# Patient Record
Sex: MALE | Race: BLACK OR AFRICAN AMERICAN | NOT HISPANIC OR LATINO | Employment: UNEMPLOYED | RURAL
[De-identification: names, ages, dates, MRNs, and addresses within clinical notes are randomized per-mention and may not be internally consistent; named-entity substitution may affect disease eponyms.]

---

## 2024-07-07 ENCOUNTER — HOSPITAL ENCOUNTER (EMERGENCY)
Facility: HOSPITAL | Age: 59
Discharge: HOME | End: 2024-07-07
Attending: EMERGENCY MEDICINE

## 2024-07-07 VITALS
BODY MASS INDEX: 21.7 KG/M2 | SYSTOLIC BLOOD PRESSURE: 130 MMHG | RESPIRATION RATE: 20 BRPM | OXYGEN SATURATION: 95 % | HEIGHT: 71 IN | HEART RATE: 103 BPM | WEIGHT: 155 LBS | TEMPERATURE: 98 F | DIASTOLIC BLOOD PRESSURE: 102 MMHG

## 2024-07-07 DIAGNOSIS — R52 BODY ACHES: Primary | ICD-10-CM

## 2024-07-07 PROCEDURE — 99281 EMR DPT VST MAYX REQ PHY/QHP: CPT

## 2024-07-07 NOTE — ED PROVIDER NOTES
"Encounter Date: 7/7/2024       History     Chief Complaint   Patient presents with    Generalized Body Aches     Pt c/o body aches "all over"      Patient is a 58-year-old male with history of mental illness who recently got out of a sober living home 3 days ago.  Since that time patient has been homeless and he is here tonight because he states he just feels bad and aches all over.  Patient denies any specific complaints.  No fever, no cough, no chest pain, no breathing trouble, no other acute problems or complaints at this time.  Patient denies suicidal or homicidal ideations.  Patient denies active hallucinations.        Review of patient's allergies indicates:   Allergen Reactions    Pcn [penicillins]      History reviewed. No pertinent past medical history.  History reviewed. No pertinent surgical history.  No family history on file.     Review of Systems   Constitutional:         Feels bad all over   All other systems reviewed and are negative.      Physical Exam     Initial Vitals [07/07/24 0306]   BP Pulse Resp Temp SpO2   (!) 130/102 103 20 98 °F (36.7 °C) 95 %      MAP       --         Physical Exam    Nursing note and vitals reviewed.  Constitutional: He appears well-developed and well-nourished.   HENT:   Head: Normocephalic and atraumatic.   Mouth/Throat: Oropharynx is clear and moist.   Eyes: Pupils are equal, round, and reactive to light.   Neck: Neck supple.   Normal range of motion.  Cardiovascular:  Normal rate and regular rhythm.           Pulmonary/Chest: Effort normal and breath sounds normal.   Abdominal: Abdomen is soft. He exhibits no distension.   Musculoskeletal:         General: Normal range of motion.      Cervical back: Normal range of motion and neck supple.     Neurological: He is alert.   Skin: Skin is warm. Capillary refill takes less than 2 seconds.   Psychiatric:   Patient is anxious, agitated, and somewhat combative.         Medical Screening Exam   See Full Note    ED Course "   Procedures  Labs Reviewed - No data to display       Imaging Results    None          Medications - No data to display  Medical Decision Making             ED Course as of 07/07/24 0332   [place] Jul 07, 2024   0330 Medical decision-making:  Differential diagnosis includes homelessness, schizophrenia, malingering, viral syndrome.  No labs or imaging were performed on this patient. [BB]      ED Course User Index  [BB] Jayme Dixon MD                           Clinical Impression:   Final diagnoses:  [R52] Body aches (Primary)        ED Disposition Condition    Discharge Stable          ED Prescriptions    None       Follow-up Information    None          Jayme Dixon MD  07/07/24 0332

## 2024-07-07 NOTE — DISCHARGE INSTRUCTIONS
Take ibuprofen or Tylenol for pain.  Follow up in clinic with psychiatrist as soon as possible.  Return to emergency department for any worsening or further problems.
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
